# Patient Record
Sex: FEMALE | Race: BLACK OR AFRICAN AMERICAN
[De-identification: names, ages, dates, MRNs, and addresses within clinical notes are randomized per-mention and may not be internally consistent; named-entity substitution may affect disease eponyms.]

---

## 2020-09-23 ENCOUNTER — HOSPITAL ENCOUNTER (INPATIENT)
Dept: HOSPITAL 92 - L&D-LIB | Age: 30
LOS: 3 days | Discharge: HOME | End: 2020-09-26
Attending: OBSTETRICS & GYNECOLOGY | Admitting: OBSTETRICS & GYNECOLOGY
Payer: COMMERCIAL

## 2020-09-23 VITALS — BODY MASS INDEX: 34.7 KG/M2

## 2020-09-23 DIAGNOSIS — O36.63X0: ICD-10-CM

## 2020-09-23 DIAGNOSIS — Z20.828: ICD-10-CM

## 2020-09-23 DIAGNOSIS — O34.211: ICD-10-CM

## 2020-09-23 DIAGNOSIS — Z3A.36: ICD-10-CM

## 2020-09-23 LAB
ALBUMIN SERPL BCG-MCNC: 3.6 G/DL (ref 3.5–5)
ALP SERPL-CCNC: 354 U/L (ref 40–110)
ALT SERPL W P-5'-P-CCNC: 12 U/L (ref 8–55)
ANION GAP SERPL CALC-SCNC: 20 MMOL/L (ref 10–20)
AST SERPL-CCNC: 18 U/L (ref 5–34)
BACTERIA UR QL AUTO: (no result) HPF
BILIRUB SERPL-MCNC: 0.3 MG/DL (ref 0.2–1.2)
BUN SERPL-MCNC: 5 MG/DL (ref 7–18.7)
CALCIUM SERPL-MCNC: 9.3 MG/DL (ref 7.8–10.44)
CHLORIDE SERPL-SCNC: 104 MMOL/L (ref 98–107)
CO2 SERPL-SCNC: 15 MMOL/L (ref 22–29)
CREAT CL PREDICTED SERPL C-G-VRATE: 150 ML/MIN (ref 70–130)
GLOBULIN SER CALC-MCNC: 3.9 G/DL (ref 2.4–3.5)
GLUCOSE SERPL-MCNC: 221 MG/DL (ref 70–105)
GLUCOSE UR STRIP-MCNC: >=1000 MG/DL
HBSAG INDEX: 0.15 S/CO (ref 0–0.99)
HGB BLD-MCNC: 12.8 G/DL (ref 12–16)
MCH RBC QN AUTO: 27.9 PG (ref 27–31)
MCV RBC AUTO: 85.7 FL (ref 78–98)
PLATELET # BLD AUTO: 208 THOU/UL (ref 130–400)
POTASSIUM SERPL-SCNC: 4.2 MMOL/L (ref 3.5–5.1)
PROT UR STRIP.AUTO-MCNC: 200 MG/DL
PROT UR-MCNC: 195 MG/DL (ref 1–14)
RBC # BLD AUTO: 4.59 MILL/UL (ref 4.2–5.4)
SODIUM SERPL-SCNC: 135 MMOL/L (ref 136–145)
SP GR UR STRIP: 1.01 (ref 1–1.04)
SYPHILIS ANTIBODY INDEX: 0.02 S/CO
URATE SERPL-MCNC: 4.3 MG/DL (ref 2.6–6)
WBC # BLD AUTO: 10.8 THOU/UL (ref 4.8–10.8)
WBC UR QL AUTO: (no result) HPF (ref 0–3)

## 2020-09-23 PROCEDURE — 85027 COMPLETE CBC AUTOMATED: CPT

## 2020-09-23 PROCEDURE — 81001 URINALYSIS AUTO W/SCOPE: CPT

## 2020-09-23 PROCEDURE — 84550 ASSAY OF BLOOD/URIC ACID: CPT

## 2020-09-23 PROCEDURE — 86901 BLOOD TYPING SEROLOGIC RH(D): CPT

## 2020-09-23 PROCEDURE — 36415 COLL VENOUS BLD VENIPUNCTURE: CPT

## 2020-09-23 PROCEDURE — 87635 SARS-COV-2 COVID-19 AMP PRB: CPT

## 2020-09-23 PROCEDURE — 80053 COMPREHEN METABOLIC PANEL: CPT

## 2020-09-23 PROCEDURE — 86850 RBC ANTIBODY SCREEN: CPT

## 2020-09-23 PROCEDURE — 36416 COLLJ CAPILLARY BLOOD SPEC: CPT

## 2020-09-23 PROCEDURE — 86780 TREPONEMA PALLIDUM: CPT

## 2020-09-23 PROCEDURE — 83615 LACTATE (LD) (LDH) ENZYME: CPT

## 2020-09-23 PROCEDURE — 87340 HEPATITIS B SURFACE AG IA: CPT

## 2020-09-23 PROCEDURE — 86900 BLOOD TYPING SEROLOGIC ABO: CPT

## 2020-09-23 PROCEDURE — 51702 INSERT TEMP BLADDER CATH: CPT

## 2020-09-23 PROCEDURE — U0003 INFECTIOUS AGENT DETECTION BY NUCLEIC ACID (DNA OR RNA); SEVERE ACUTE RESPIRATORY SYNDROME CORONAVIRUS 2 (SARS-COV-2) (CORONAVIRUS DISEASE [COVID-19]), AMPLIFIED PROBE TECHNIQUE, MAKING USE OF HIGH THROUGHPUT TECHNOLOGIES AS DESCRIBED BY CMS-2020-01-R: HCPCS

## 2020-09-23 PROCEDURE — 84156 ASSAY OF PROTEIN URINE: CPT

## 2020-09-23 PROCEDURE — 82570 ASSAY OF URINE CREATININE: CPT

## 2020-09-23 RX ADMIN — INSULIN HUMAN SCH UNITS: 100 INJECTION, SUSPENSION SUBCUTANEOUS at 18:24

## 2020-09-24 RX ADMIN — DOCUSATE CALCIUM SCH MG: 240 CAPSULE, LIQUID FILLED ORAL at 21:01

## 2020-09-24 RX ADMIN — INSULIN HUMAN SCH UNIT: 100 INJECTION, SUSPENSION SUBCUTANEOUS at 11:33

## 2020-09-24 RX ADMIN — DOCUSATE CALCIUM SCH: 240 CAPSULE, LIQUID FILLED ORAL at 12:36

## 2020-09-25 LAB
HGB BLD-MCNC: 9.1 G/DL (ref 12–16)
MCH RBC QN AUTO: 29.2 PG (ref 27–31)
MCV RBC AUTO: 87.8 FL (ref 78–98)
PLATELET # BLD AUTO: 201 THOU/UL (ref 130–400)
RBC # BLD AUTO: 3.13 MILL/UL (ref 4.2–5.4)
WBC # BLD AUTO: 11.8 THOU/UL (ref 4.8–10.8)

## 2020-09-25 RX ADMIN — SIMETHICONE PRN MG: 80 TABLET, CHEWABLE ORAL at 14:13

## 2020-09-25 RX ADMIN — HYDROCODONE BITARTRATE AND ACETAMINOPHEN PRN TAB: 5; 325 TABLET ORAL at 14:13

## 2020-09-25 RX ADMIN — SIMETHICONE PRN MG: 80 TABLET, CHEWABLE ORAL at 21:05

## 2020-09-25 RX ADMIN — INSULIN HUMAN SCH: 100 INJECTION, SUSPENSION SUBCUTANEOUS at 18:42

## 2020-09-25 RX ADMIN — HYDROCODONE BITARTRATE AND ACETAMINOPHEN PRN TAB: 5; 325 TABLET ORAL at 08:42

## 2020-09-25 RX ADMIN — INSULIN HUMAN SCH: 100 INJECTION, SUSPENSION SUBCUTANEOUS at 08:07

## 2020-09-25 RX ADMIN — Medication SCH: at 18:58

## 2020-09-25 RX ADMIN — SIMETHICONE PRN MG: 80 TABLET, CHEWABLE ORAL at 08:44

## 2020-09-25 RX ADMIN — DOCUSATE CALCIUM SCH MG: 240 CAPSULE, LIQUID FILLED ORAL at 08:44

## 2020-09-25 RX ADMIN — Medication SCH: at 02:10

## 2020-09-25 RX ADMIN — Medication SCH ML: at 08:48

## 2020-09-25 RX ADMIN — DOCUSATE CALCIUM SCH MG: 240 CAPSULE, LIQUID FILLED ORAL at 20:58

## 2020-09-26 VITALS — DIASTOLIC BLOOD PRESSURE: 90 MMHG | TEMPERATURE: 98.6 F | SYSTOLIC BLOOD PRESSURE: 132 MMHG

## 2020-09-26 RX ADMIN — SIMETHICONE PRN MG: 80 TABLET, CHEWABLE ORAL at 05:34

## 2020-09-26 RX ADMIN — DOCUSATE CALCIUM SCH MG: 240 CAPSULE, LIQUID FILLED ORAL at 08:38

## 2020-09-27 NOTE — OP
DATE OF PROCEDURE:  2020



ATTENDING SURGEON:  Madan Ceron MD



RESIDENT SURGEON:  Charmaine Chen MD



PREOPERATIVE DIAGNOSES:  

1. Intrauterine pregnancy at 36 and 4/7th weeks.

2. Severe preeclampsia.

3. Prior .



POSTOPERATIVE DIAGNOSES:  

1. Intrauterine pregnancy at 36 and 4/7th weeks.

2. Severe preeclampsia.

3. Prior .



PROCEDURE PERFORMED:  Repeat low transverse  section.



ANESTHESIA:  Spinal catheterization.



FINDINGS:  

1. Severe preeclampsia with blood pressure and proteinuria criteria.

2. Infant large for gestational age secondary to poorly-controlled gestational

diabetes. 

3. Normal uterus, tubes, and ovaries.



COMPLICATIONS:  None.



SPECIMENS REMOVED:  Cord blood.



ESTIMATED BLOOD LOSS:  Blood loss is approximately 800 mL ().



HISTORY AND INDICATIONS:  Ms. Jan Faustin is a very pleasant 30-year-old black

female,  3, para 1-0-0-1-1 who is following my clinic for obstetric care.

Her antepartum complications include previous , history of preeclampsia,

and gestational diabetes.  She was followed very closely in the clinic with

 testing because of her poorly-controlled gestational diabetes, which we

just recently maintained adequate control. Her blood sugars were stable.  She

presented to the office on the afternoon of 2020 for prenatal visit and

 testing.  Her initial blood pressures were 180/110.  She had 2+ protein.

She was given a dose of steroids in the clinic secondary to her  status.  She

was sent to Labor and Delivery for serial blood pressures and monitoring as well as

laboratory studies to confirm a diagnosis of severe preeclampsia.  Her blood

pressures were controlled overnight and she was given a second dose of steroids.

She was scheduled for a repeat  at 36 and 4/7th weeks secondary to severe

preeclampsia.  The patient and her  were extensively counseled regarding her

diagnosis and the indications for  delivery.  Questions were answered to the

patient and family's satisfaction. 



DESCRIPTION OF PROCEDURE:  After thorough consent and counseling, the patient was

taken to the operating room and adequate level of anesthesia was obtained via spinal

catheterization.  The patient was prepped and draped in usual sterile fashion for

abdominal surgery.  A Mcknight was placed in the bladder, which was noted to be

draining clear urine.  A team time-out was performed per protocol.  Attention was

then turned to performing the repeat low-transverse  section. 



A Pfannenstiel incision was made, carried sharply to the fascia, which was also

sharply incised.  The midline was identified and the rectus muscles were retracted

laterally.  The abdominal peritoneal cavity was entered with usual safeguards

carried out.  A retractor was placed and a bladder flap was created on the

vesicouterine peritoneum.  A bladder blade was then placed.  A low-transverse

incision was made on the well-developed lower uterine segment.  Upon entering the

amniotic sac, a copious amount of clear amniotic fluid was visualized.  The infant

was noted to be vertex presentation in the occiput anterior position, still high in

the pelvis.  Head was delivered.  Baby was bulb suctioned on the abdomen.  Shoulders

and body were then delivered in an atraumatic fashion.  The cord was doubly clamped

and cut, and the infant was handed to the pediatric team in attendance for the

delivery.  The infant was a vigorous viable male, weighing 8 pounds 9 ounces with

Apgars of 8 and 9 obtained at one and five minutes respectively.  Cord blood was

obtained.  The placenta was manually removed from the uterus.  The uterus was

exteriorized and adequate tone was noted.  The uterine cavity was cleared of clot

and fluid.  The low-transverse incision was closed with a running locking ligature

of #1 chromic.  A second imbricating layer was placed to facilitate strength and

hemostasis.  The vesicouterine peritoneum was reapproximated to the lower segment

with a running ligature of 2-0 Monocryl suture.  The posterior cul-de-sac and

gutters were cleared of clot and fluid.  The incision was carefully inspected and

noted to be hemostatic.  Seprafilm was applied to the low-transverse incision and to

the anterior aspect of the uterus for adhesion prevention.  The uterus was returned

to the abdomen.  Good tone and hemostasis again appreciated.  Lap, sponge, and

needle counts were correct.  The peritoneum was closed with a running ligature of

2-0 Vicryl.  The rectus muscles were reapproximated in the midline with interrupted

ligatures of both 2-0 Vicryl and #1 chromic suture.  The fascia was closed with 2

ligatures of 0 Vicryl suture, which were tied in the midline.  Good fascial

integrity was appreciated.  The incision was irrigated with copious amount of warm

normal saline.  The subcutaneous tissue was closed with interrupted ligatures of 2-0

plain.  The skin was closed with a subcuticular stitch of 4-0 Monocryl and dressed

with Dermabond.  A pressure dressing and ice packs were subsequently placed.  Lap,

sponge, and needle counts were correct x3.  Estimated blood loss during the surgical

procedure was approximately 800 mL.  Team debriefing was performed. 



The patient was taken to the recovery room in good condition.  Immediately following

surgery, the patient and family were made aware of the surgical procedure and

operative findings.  The baby was returned to mother for skin-to-skin contact and

breastfeeding shortly after delivery.  Both mother and baby were doing well

postpartum.  The patient and her  were very appreciative of the care rendered

here at [facility] this morning. 







Job ID:  [id number]